# Patient Record
Sex: FEMALE | Race: BLACK OR AFRICAN AMERICAN | Employment: FULL TIME | URBAN - METROPOLITAN AREA
[De-identification: names, ages, dates, MRNs, and addresses within clinical notes are randomized per-mention and may not be internally consistent; named-entity substitution may affect disease eponyms.]

---

## 2019-10-16 ENCOUNTER — APPOINTMENT (OUTPATIENT)
Dept: GENERAL RADIOLOGY | Facility: CLINIC | Age: 24
End: 2019-10-16
Attending: EMERGENCY MEDICINE
Payer: COMMERCIAL

## 2019-10-16 ENCOUNTER — HOSPITAL ENCOUNTER (EMERGENCY)
Facility: CLINIC | Age: 24
Discharge: HOME | End: 2019-10-16
Attending: EMERGENCY MEDICINE | Admitting: EMERGENCY MEDICINE
Payer: COMMERCIAL

## 2019-10-16 VITALS
BODY MASS INDEX: 24.16 KG/M2 | SYSTOLIC BLOOD PRESSURE: 116 MMHG | HEART RATE: 82 BPM | WEIGHT: 145 LBS | HEIGHT: 65 IN | DIASTOLIC BLOOD PRESSURE: 75 MMHG | TEMPERATURE: 97.6 F | OXYGEN SATURATION: 98 %

## 2019-10-16 DIAGNOSIS — J40 BRONCHITIS: ICD-10-CM

## 2019-10-16 LAB
DEPRECATED S PYO AG THROAT QL EIA: NORMAL
SPECIMEN SOURCE: NORMAL

## 2019-10-16 PROCEDURE — 99284 EMERGENCY DEPT VISIT MOD MDM: CPT | Mod: 25

## 2019-10-16 PROCEDURE — 71046 X-RAY EXAM CHEST 2 VIEWS: CPT

## 2019-10-16 PROCEDURE — 87081 CULTURE SCREEN ONLY: CPT | Performed by: EMERGENCY MEDICINE

## 2019-10-16 PROCEDURE — 87880 STREP A ASSAY W/OPTIC: CPT | Performed by: EMERGENCY MEDICINE

## 2019-10-16 RX ORDER — IBUPROFEN 200 MG
200 TABLET ORAL EVERY 4 HOURS PRN
COMMUNITY

## 2019-10-16 RX ORDER — AZITHROMYCIN 250 MG/1
TABLET, FILM COATED ORAL
Qty: 6 TABLET | Refills: 0 | Status: SHIPPED | OUTPATIENT
Start: 2019-10-16 | End: 2019-10-21

## 2019-10-16 SDOH — HEALTH STABILITY: MENTAL HEALTH: HOW OFTEN DO YOU HAVE A DRINK CONTAINING ALCOHOL?: NEVER

## 2019-10-16 ASSESSMENT — ENCOUNTER SYMPTOMS
DIARRHEA: 0
COUGH: 1
VOMITING: 0
FEVER: 1

## 2019-10-16 ASSESSMENT — MIFFLIN-ST. JEOR: SCORE: 1403.6

## 2019-10-16 NOTE — ED PROVIDER NOTES
"  History   Chief Complaint:  Sore Throat    HPI   Savage Leyva is a 24 year old female, who presents to the ED for evaluation of epigastric pain. The patient notes having a cold for the past three weeks containing an intermittent fever. She states she has a sore throat, and it hurts to the point where it is difficult to swallow and the pain radiates form her throat down into her chest. She has had a cough for the entire three weeks, and has progressed since the start. When she coughs she notes her entire esophagus hurts, even into her chest. She has had trouble consuming oral fluids and foods. The patient states she has been taking ibuprofen and NyQuil for the pain and they have not helped relieve her symptoms. She denies any ear pain, vomiting, diarrhea, leg swelling, or any other acute symptoms.      PE/DVT RISK FACTORS:  Sex:    Female  Hormones:   No  Tobacco:   No  Cancer:   No  Travel:   No  Surgery:   No  Other immobilization: No  Personal history:  No  Family history:  No      Allergies:  No known drug allergies    Medications:    The patient is not currently taking any prescribed medications.    Past Medical History:    History reviewed.  No pertinent past medical history.    Past Surgical History:    History reviewed. No pertinent surgical history.    Family History:    History reviewed. No pertinent family history.     Social History:  Smoking status: Never  Alcohol use: Never  Drug use: Never  Presents to the ED with friend  Marital Status:  Single [1]    Review of Systems   Constitutional: Positive for fever.   Respiratory: Positive for cough.    Cardiovascular: Positive for chest pain. Negative for leg swelling.   Gastrointestinal: Negative for diarrhea and vomiting.   All other systems reviewed and are negative.    Physical Exam     Patient Vitals for the past 24 hrs:   BP Temp Temp src Pulse SpO2 Height Weight   10/16/19 1603 125/78 97.6  F (36.4  C) Oral 82 100 % 1.651 m (5' 5\") 65.8 kg (145 lb) "     Physical Exam  General: Resting comfortably on the gurney  Eyes:  The pupils are equal and round    Conjunctivae and sclerae are normal  ENT:    The nose is normal    Pinnae are normal    The oropharynx with erythema posteriorly. No exudates  Neck:  Normal range of motion    Trachea is in the midline  CV:  Regular rate and rhythm     No edema  Resp:  Lungs are clear    Non-labored    No rales    No wheezing   GI:  Abdomen is soft with no tenderness, there is no rigidity    No distension    No rebound tenderness   MS:  Normal muscular tone    No asymmetric leg swelling  Skin:  No rash or acute skin lesions noted  Neuro:   Awake, alert.      Speech is normal and fluent.    Face is symmetric.     Moves all extremities    Emergency Department Course   Imaging:  Radiographic findings were communicated with the patient who voiced understanding of the findings.    XR Chest, 2 views:  Negative chest. Lungs clear    Imaging independently reviewed and agree with radiologist interpretation.    Laboratory:  Rapid Strep Test: Negative  Strep Culture: Pending    Emergency Department Course:  Past medical records, nursing notes, and vitals reviewed.  1710: I performed an exam of the patient and obtained history, as documented above.  Strep test was performed, results as noted above.  The patient was sent for a chest x-ray while in the emergency department, findings above.    1756: I rechecked the patient. Explained findings to patient.    Findings and plan explained to the Patient. Patient discharged home with instructions regarding supportive care, medications, and reasons to return. The importance of close follow-up was reviewed.     Impression & Plan    Medical Decision Making:  Savage Leyva is a 24 year old female who presents the emergency department with 3 weeks of cough and sore throat.  Symptoms have been persistent.  She is been using ibuprofen and NyQuil at night for symptom control.  No measured fevers.  No leg pain  or swelling.  No recent travel.  X-ray obtained and is normal per my read.  Rapid strep was also negative.  Given 3 weeks of continuous symptoms we will treat with antibiotic for presumed bacterial bronchitis.  Patient comfortable with plan.  If not improving should return to the emergency department or follow-up with primary care provider.  She is discharged home and encouraged return with any new or concerning symptoms.    Diagnosis:    ICD-10-CM    1. Bronchitis J40 Beta strep group A culture     Beta strep group A culture     Disposition:  Discharged to home.    Discharge Medications:  New Prescriptions    AZITHROMYCIN (ZITHROMAX Z-ERI) 250 MG TABLET    Two tablets on the first day, then one tablet daily for the next 4 days       Billy Lamar  10/16/2019    EMERGENCY DEPARTMENT  Scribe Disclosure:  I, iBlly Lamar, am serving as a scribe at 5:10 PM on 10/16/2019 to document services personally performed by Tenzin Yung MD based on my observations and the provider's statements to me.         Tenzin Yung MD  10/17/19 0013

## 2019-10-16 NOTE — ED AVS SNAPSHOT
Emergency Department  [address] [place] [address]  Phone:  [phone]  Fax:  [phone]                                    Savage Leyva   MRN: [id number]    Department:   Emergency Department   Date of Visit:  10/16/2019           After Visit Summary Signature Page    I have received my discharge instructions, and my questions have been answered. I have discussed any challenges I see with this plan with the nurse or doctor.    ..........................................................................................................................................  Patient/Patient Representative Signature      ..........................................................................................................................................  Patient Representative Print Name and Relationship to Patient    ..................................................               ................................................  Date                                   Time    ..........................................................................................................................................  Reviewed by Signature/Title    ...................................................              ..............................................  Date                                               Time          22EPIC Rev 08/18

## 2019-10-17 NOTE — RESULT ENCOUNTER NOTE
Preliminary Beta strep group A r/o culture is PENDING and/or NEGATIVE at this time.   No changes in treatment per [facility] Strep protocol.

## 2019-10-18 LAB
BACTERIA SPEC CULT: NORMAL
Lab: NORMAL
SPECIMEN SOURCE: NORMAL

## 2020-03-11 PROCEDURE — 93005 ELECTROCARDIOGRAM TRACING: CPT

## 2020-03-11 PROCEDURE — 99285 EMERGENCY DEPT VISIT HI MDM: CPT | Mod: 25

## 2020-03-12 ENCOUNTER — APPOINTMENT (OUTPATIENT)
Dept: GENERAL RADIOLOGY | Facility: CLINIC | Age: 25
End: 2020-03-12
Attending: EMERGENCY MEDICINE
Payer: COMMERCIAL

## 2020-03-12 ENCOUNTER — HOSPITAL ENCOUNTER (EMERGENCY)
Facility: CLINIC | Age: 25
Discharge: HOME | End: 2020-03-12
Attending: EMERGENCY MEDICINE | Admitting: EMERGENCY MEDICINE
Payer: COMMERCIAL

## 2020-03-12 VITALS
WEIGHT: 140 LBS | RESPIRATION RATE: 20 BRPM | DIASTOLIC BLOOD PRESSURE: 77 MMHG | BODY MASS INDEX: 22.5 KG/M2 | SYSTOLIC BLOOD PRESSURE: 113 MMHG | OXYGEN SATURATION: 100 % | HEART RATE: 82 BPM | TEMPERATURE: 97.7 F | HEIGHT: 66 IN

## 2020-03-12 DIAGNOSIS — R07.9 CHEST PAIN, UNSPECIFIED TYPE: ICD-10-CM

## 2020-03-12 LAB
ANION GAP SERPL CALCULATED.3IONS-SCNC: 6 MMOL/L (ref 3–14)
BASOPHILS # BLD AUTO: 0 10E9/L (ref 0–0.2)
BASOPHILS NFR BLD AUTO: 0.2 %
BUN SERPL-MCNC: 8 MG/DL (ref 7–30)
CALCIUM SERPL-MCNC: 9.1 MG/DL (ref 8.5–10.1)
CHLORIDE SERPL-SCNC: 105 MMOL/L (ref 94–109)
CO2 SERPL-SCNC: 26 MMOL/L (ref 20–32)
CREAT SERPL-MCNC: 0.67 MG/DL (ref 0.52–1.04)
D DIMER PPP FEU-MCNC: <0.3 UG/ML FEU (ref 0–0.5)
DIFFERENTIAL METHOD BLD: NORMAL
EOSINOPHIL # BLD AUTO: 0.1 10E9/L (ref 0–0.7)
EOSINOPHIL NFR BLD AUTO: 1.7 %
ERYTHROCYTE [DISTWIDTH] IN BLOOD BY AUTOMATED COUNT: 13.6 % (ref 10–15)
GFR SERPL CREATININE-BSD FRML MDRD: >90 ML/MIN/{1.73_M2}
GLUCOSE SERPL-MCNC: 93 MG/DL (ref 70–99)
HCG SERPL QL: NEGATIVE
HCT VFR BLD AUTO: 39.4 % (ref 35–47)
HGB BLD-MCNC: 13.8 G/DL (ref 11.7–15.7)
IMM GRANULOCYTES # BLD: 0 10E9/L (ref 0–0.4)
IMM GRANULOCYTES NFR BLD: 0.2 %
INTERPRETATION ECG - MUSE: NORMAL
LYMPHOCYTES # BLD AUTO: 3.1 10E9/L (ref 0.8–5.3)
LYMPHOCYTES NFR BLD AUTO: 56.4 %
MCH RBC QN AUTO: 29.9 PG (ref 26.5–33)
MCHC RBC AUTO-ENTMCNC: 35 G/DL (ref 31.5–36.5)
MCV RBC AUTO: 86 FL (ref 78–100)
MONOCYTES # BLD AUTO: 0.7 10E9/L (ref 0–1.3)
MONOCYTES NFR BLD AUTO: 12.9 %
NEUTROPHILS # BLD AUTO: 1.6 10E9/L (ref 1.6–8.3)
NEUTROPHILS NFR BLD AUTO: 28.6 %
PLATELET # BLD AUTO: 335 10E9/L (ref 150–450)
POTASSIUM SERPL-SCNC: 3.6 MMOL/L (ref 3.4–5.3)
RBC # BLD AUTO: 4.61 10E12/L (ref 3.8–5.2)
SODIUM SERPL-SCNC: 137 MMOL/L (ref 133–144)
TROPONIN I SERPL-MCNC: <0.015 UG/L (ref 0–0.04)
WBC # BLD AUTO: 5.4 10E9/L (ref 4–11)

## 2020-03-12 PROCEDURE — 85379 FIBRIN DEGRADATION QUANT: CPT | Performed by: EMERGENCY MEDICINE

## 2020-03-12 PROCEDURE — 80048 BASIC METABOLIC PNL TOTAL CA: CPT | Performed by: EMERGENCY MEDICINE

## 2020-03-12 PROCEDURE — 84484 ASSAY OF TROPONIN QUANT: CPT | Performed by: EMERGENCY MEDICINE

## 2020-03-12 PROCEDURE — 71046 X-RAY EXAM CHEST 2 VIEWS: CPT

## 2020-03-12 PROCEDURE — 84703 CHORIONIC GONADOTROPIN ASSAY: CPT | Performed by: EMERGENCY MEDICINE

## 2020-03-12 PROCEDURE — 85025 COMPLETE CBC W/AUTO DIFF WBC: CPT | Performed by: EMERGENCY MEDICINE

## 2020-03-12 ASSESSMENT — ENCOUNTER SYMPTOMS
FEVER: 0
SHORTNESS OF BREATH: 1
COUGH: 0

## 2020-03-12 ASSESSMENT — MIFFLIN-ST. JEOR: SCORE: 1391.79

## 2020-03-12 NOTE — ED TRIAGE NOTES
Pt presents with complaints of upper chest pain that began this morning. Pt reports mild SOB present with symptoms. Denies cough/fever.

## 2020-03-12 NOTE — ED AVS SNAPSHOT
Emergency Department  [address] [facility] [address]  Phone:  [phone]  Fax:  [phone]                                    Savage Leyva   MRN: [id number]    Department:   Emergency Department   Date of Visit:  3/11/2020           After Visit Summary Signature Page    I have received my discharge instructions, and my questions have been answered. I have discussed any challenges I see with this plan with the nurse or doctor.    ..........................................................................................................................................  Patient/Patient Representative Signature      ..........................................................................................................................................  Patient Representative Print Name and Relationship to Patient    ..................................................               ................................................  Date                                   Time    ..........................................................................................................................................  Reviewed by Signature/Title    ...................................................              ..............................................  Date                                               Time          22EPIC Rev 08/18

## 2020-03-12 NOTE — ED PROVIDER NOTES
History     Chief Complaint:  Chest Pain    HPI   Savage Leyva is a 25 year old female, otherwise healthy, who presents with chest pain. The patient reports that today, throughout the entire day, she has been experiencing bilateral chest pain that remained constant. She endorses associated shortness of breath with the pain. The patient denies rash, fever, cough, or recent falls/injuries. The patient has not taken anything for the pain.      Cardiac/PE/DVT Risk Factors:  History of hypertension - Negative   History of hyperlipidemia - Negative   History of diabetes - Negative   History of smoking - Negative   Personal history of PE/DVT - Negative   Family history of PE/DVT - unknown  Family history of heart complications - unknown  Recent travel - Negative   Recent surgery - Negative   Other immobilizations - Negative   Cancer - Negative   Hormone Use - Negative      Allergies:  No known drug allergies.       Medications:    No current medications.     Past Medical History:    Medical history reviewed. No pertinent medical history.      Past Surgical History:    Past surgical history reviewed. No pertinent past surgical history.     Family History:    Family history reviewed. No pertinent family history.     Social History:  The patient was accompanied to the ED by mother and sisters.  Smoking Status: Never Smoker  Smokeless Tobacco: Never Used  Alcohol Use: Negative   Drug Use: Negative  Marital Status:  Single      Review of Systems   Constitutional: Negative for fever.   Respiratory: Positive for shortness of breath. Negative for cough.    Cardiovascular: Positive for chest pain.   Skin: Negative for rash.   All other systems reviewed and are negative.    Physical Exam     Patient Vitals for the past 24 hrs:   BP Temp Temp src Pulse Heart Rate Resp SpO2 Height Weight   03/12/20 0149 113/77 -- -- 82 -- -- -- -- --   03/12/20 0002 128/57 -- -- -- -- -- -- -- --   03/12/20 0000 -- 97.7  F (36.5  C) Oral 102 102 20  "100 % 1.676 m (5' 6\") 63.5 kg (140 lb)      Physical Exam    General: Alert, appears well-developed and well-nourished. Cooperative.     In no distress  HEENT:  Head:  Atraumatic  Ears:  External ears are normal  Mouth/Throat:  Oropharynx is without erythema or exudate and mucous membranes are moist.   Eyes:   Conjunctivae normal and EOM are normal. No scleral icterus.    Pupils are equal, round, and reactive to light.   CV:  Normal rate, regular rhythm, normal heart sounds and radial pulses are 2+ and symmetric.  No murmur.  Resp:  Breath sounds are clear bilaterally    Non-labored, no retractions or accessory muscle use  GI:  Abdomen is soft, no distension, no tenderness. No rebound or guarding.  No CVA tenderness bilaterally  MS:  Normal range of motion. No edema.    Normal strength in all 4 extremities.     Back atraumatic.    No midline cervical, thoracic, or lumbar tenderness  Skin:  Warm and dry.  No rash or lesions noted.  Neuro: Alert. Normal strength.  GCS: 15  Psych:  Normal mood and affect.  Lymph: No anterior or posterior cervical lymphadenopathy noted.    Emergency Department Course     ECG:  ECG taken at 0007, ECG read at 0013  Normal sinus rhythm  Normal ECG  Rate 89 bpm. NJ interval 142 ms. QRS duration 80 ms. QT/QTc 358/435 ms. P-R-T axes 79 60 17.    Imaging:  Radiology findings were communicated with the patient who voiced understanding of the findings.    XR Chest 2 Views  Negative chest.  Reading per radiology      Laboratory:  Laboratory findings were communicated with the patient who voiced understanding of the findings.    CBC: WBC 5.4, HGB 13.8,   BMP: WNL (Creatinine 0.67)    Troponin (Collected 0028): <0.015  D Dimer: <0.3    HCG Qualitative: Negative      Emergency Department Course:    0007 EKG obtained as noted above.     0028 IV was inserted and blood was drawn for laboratory testing, results above.     0029 Nursing notes and vitals reviewed. I performed an exam of the patient " as documented above.     0117 The patient was sent for a XR while in the emergency department, results above.      0130 Prior to discharge, I personally reviewed the results with the patient and all related questions were answered. The patient verbalized understanding and is amenable to plan.     Impression & Plan      Medical Decision Making:  Savage Leyva is a 25 year old female with no pertinent past medical history who presents with chest pain sudden onset this morning.  Patient symptoms were resolved prior to arrival here in the emergency department.  Reassuring EKG, shows no concerning ischemic changes and troponin is undetectable.  Very low concern for ACS particularly with no risk factors for ACS. Patient had some mild associated shortness of breath with this and reassuringly d-dimer undetectable, low concern for pulmonary embolism.  No evidence of renal dysfunction, normal electrolytes, normal CBC with no signs of anemia.  Chest x-ray shows no evidence of spontaneous pneumothorax or focal infiltrate.  Unclear exact etiology of the patient's chest pain shortness of breath symptoms today, but low concern for sinister pathologies requiring admission or further emergent work-up.  Close follow-up with primary care recommended in 3 to 5 days for recheck.  Return sooner to the emergency department with change in symptoms or return of severe symptom onset.  After all questions answered, discharged home    Diagnosis:    ICD-10-CM    1. Chest pain, unspecified type  R07.9      Disposition:   The patient is discharged to home.     Discharge Medications:  No discharge medications.     Scribe Disclosure:  I, Orla Severson, am serving as a scribe at 12:14 AM on 3/12/2020 to document services personally performed by Preston Martinez MD based on my observations and the provider's statements to me.    EMERGENCY DEPARTMENT       Preston Martinez MD  03/12/20 9802